# Patient Record
Sex: FEMALE
[De-identification: names, ages, dates, MRNs, and addresses within clinical notes are randomized per-mention and may not be internally consistent; named-entity substitution may affect disease eponyms.]

---

## 2019-01-11 PROBLEM — Z00.00 ENCOUNTER FOR PREVENTIVE HEALTH EXAMINATION: Status: ACTIVE | Noted: 2019-01-11

## 2019-01-18 ENCOUNTER — APPOINTMENT (OUTPATIENT)
Dept: ENDOCRINOLOGY | Facility: CLINIC | Age: 24
End: 2019-01-18
Payer: MEDICAID

## 2019-01-18 VITALS
BODY MASS INDEX: 28.7 KG/M2 | HEART RATE: 72 BPM | DIASTOLIC BLOOD PRESSURE: 74 MMHG | SYSTOLIC BLOOD PRESSURE: 110 MMHG | HEIGHT: 63 IN | WEIGHT: 162 LBS

## 2019-01-18 DIAGNOSIS — Z87.42 PERSONAL HISTORY OF OTHER DISEASES OF THE FEMALE GENITAL TRACT: ICD-10-CM

## 2019-01-18 PROCEDURE — 99205 OFFICE O/P NEW HI 60 MIN: CPT | Mod: GC

## 2019-01-18 RX ORDER — METFORMIN ER 500 MG 500 MG/1
500 TABLET ORAL DAILY
Qty: 30 | Refills: 5 | Status: ACTIVE | COMMUNITY
Start: 2019-01-18 | End: 1900-01-01

## 2019-01-18 RX ORDER — CLOMIPHENE CITRATE 50 MG/1
50 TABLET ORAL DAILY
Qty: 60 | Refills: 1 | Status: ACTIVE | COMMUNITY
Start: 2019-01-18 | End: 1900-01-01

## 2019-01-18 RX ORDER — METHYL SALICYLATE/MENTHOL 30 %-10 %
CREAM (GRAM) TOPICAL
Qty: 3 | Refills: 5 | Status: ACTIVE | COMMUNITY
Start: 2019-01-18 | End: 1900-01-01

## 2019-01-22 LAB
DHEA-S SERPL-MCNC: 171 UG/DL
FSH SERPL-MCNC: 4.6 IU/L
LH SERPL-ACNC: 5.5 IU/L
T4 FREE SERPL-MCNC: 1.1 NG/DL
TSH SERPL-ACNC: 0.92 UIU/ML

## 2019-01-22 NOTE — ASSESSMENT
[FreeTextEntry1] : 23 year old female, here for initial visit for infertility and PCOS. She has been trying to get pregnant for over 1 year. She has had fertility testing done at [facility] by family planning, unclear exactly what was done. Currently on clomid 50mg (day 5-9 of menstrual period). Discontinued her metformin in oct 2018 due to side effects. \par \par 1. Infertility\par -Will repeat testosterone, DHEA-S, 17-OH progesterone, FSH, LH, TSH, FT4\par -Increase clomid to 100mg Day 5-9 of menstruation \par -May consider switching to letrozole in 2 months \par -Advised to have intercourse every other day following taking Clomid tablets.\par -RX given for ovulation kit\par \par 2. Overweight \par -Start Met ER 500mg \par \par 3. PCOS \par -Will restart metformin but ER 500mg QD \par -Advised to do exercise 30 mins daily, 4x/week \par \par Patient understood above, all questions were answered to patient's understanding. \par \par Addendum 1/22: Discussed all lab work with patient. She will complete clomid 100mg this wednesday.  \par \par

## 2019-01-22 NOTE — PHYSICAL EXAM
[Alert] : alert [No Acute Distress] : no acute distress [Well Nourished] : well nourished [Well Developed] : well developed [PERRL] : pupils equal, round and reactive to light [EOMI] : extra ocular movement intact [No Proptosis] : no proptosis [No Neck Mass] : no neck mass was observed [Supple] : the neck was supple [Thyroid Not Enlarged] : the thyroid was not enlarged [Normal Rate] : heart rate was normal  [Normal S1, S2] : normal S1 and S2 [Regular Rhythm] : with a regular rhythm [Not Tender] : non-tender [Soft] : abdomen soft [Not Distended] : not distended [No CVA Tenderness] : no ~M costovertebral angle tenderness [Normal Gait] : normal gait [No Joint Swelling] : no joint swelling seen [No Rash] : no rash [No Skin Lesions] : no skin lesions [Cranial Nerves Intact] : cranial nerves 2-12 were intact [No Tremors] : no tremors [Oriented x3] : oriented to person, place, and time [Normal Insight/Judgement] : insight and judgment were intact [Normal Affect] : the affect was normal [Normal Mood] : the mood was normal [de-identified] : +Overweight

## 2019-01-22 NOTE — ADDENDUM
[FreeTextEntry1] : Pt seen and  discussed with fellow. I agree with Dr. Fagan's assessment and plan.  Infertility and PCOS history, did not tolerate regular metformin.  Periods somewhat irregular, so I think she would benefit from metformin; will try ER version.  Advised diet and exercise and weight loss to improve fertility.  Try clomiphene at higher dose (100mg) and then if pregnancy not successful can either switch to letrozole or refer to repro endocrine.

## 2019-01-22 NOTE — HISTORY OF PRESENT ILLNESS
[FreeTextEntry1] : 23 year old female, here for initial visit for infertility and PCOS. She has been trying to get pregnant for over 1 year. She has fertility testing done at [facility] by family planning, unclear exactly what was done but she was told that she is ovulating and underwent possible HSG?. She was told she had PCOS is august of last year and was started on metformin 500mg BID but due to nausea and vomiting s/e she decreased her dose to once daily and then stopped taking it in Oct 2018. She was started on clomid 50mg 3 months ago and continues to take it. She takes it on the 5th day of her period to the 9th day of her period. She has one history of pregnancy in  and had an  in  (did not take a pill, states likely it was a D&C). She is currently menstruating, her first day was . Overall she states she has had irregular periods occurring every 30-45 days. States her  had his sperm checked, it was wnl. \par \par PMH: PCOS, infertility, overweight , h/o chlamydia (treated)\par PSH: D/C? in rita \par SH: Denies smoking, alcohol, drug use. \par FH: grandmother DM\par Home meds: clomid 50mg day 5-9 of menstrual cycle \par

## 2019-01-24 LAB — 17OHP SERPL-MCNC: 28 NG/DL

## 2019-02-26 LAB
TESTOST BND SERPL-MCNC: 0.8 PG/ML
TESTOST SERPL-MCNC: 26.3 NG/DL

## 2019-03-20 ENCOUNTER — APPOINTMENT (OUTPATIENT)
Dept: ENDOCRINOLOGY | Facility: CLINIC | Age: 24
End: 2019-03-20
Payer: MEDICAID

## 2019-03-20 VITALS
WEIGHT: 156 LBS | SYSTOLIC BLOOD PRESSURE: 118 MMHG | HEART RATE: 82 BPM | DIASTOLIC BLOOD PRESSURE: 77 MMHG | BODY MASS INDEX: 27.64 KG/M2 | HEIGHT: 63 IN

## 2019-03-20 DIAGNOSIS — N97.9 FEMALE INFERTILITY, UNSPECIFIED: ICD-10-CM

## 2019-03-20 DIAGNOSIS — E28.2 POLYCYSTIC OVARIAN SYNDROME: ICD-10-CM

## 2019-03-20 DIAGNOSIS — E66.3 OVERWEIGHT: ICD-10-CM

## 2019-03-20 PROCEDURE — 99214 OFFICE O/P EST MOD 30 MIN: CPT | Mod: GC

## 2019-03-21 PROBLEM — N97.9 INFERTILITY, FEMALE: Status: ACTIVE | Noted: 2019-01-18

## 2019-03-21 PROBLEM — E28.2 PCOS (POLYCYSTIC OVARIAN SYNDROME): Status: ACTIVE | Noted: 2019-01-18

## 2019-03-21 PROBLEM — E66.3 OVERWEIGHT (BMI 25.0-29.9): Status: ACTIVE | Noted: 2019-01-18
